# Patient Record
Sex: FEMALE | Race: WHITE
[De-identification: names, ages, dates, MRNs, and addresses within clinical notes are randomized per-mention and may not be internally consistent; named-entity substitution may affect disease eponyms.]

---

## 2017-05-28 ENCOUNTER — HOSPITAL ENCOUNTER (EMERGENCY)
Dept: HOSPITAL 25 - UCCORT | Age: 42
Discharge: HOME | End: 2017-05-28
Payer: COMMERCIAL

## 2017-05-28 VITALS — DIASTOLIC BLOOD PRESSURE: 85 MMHG | SYSTOLIC BLOOD PRESSURE: 130 MMHG

## 2017-05-28 DIAGNOSIS — M72.2: Primary | ICD-10-CM

## 2017-05-28 PROCEDURE — 99212 OFFICE O/P EST SF 10 MIN: CPT

## 2017-05-28 PROCEDURE — G0463 HOSPITAL OUTPT CLINIC VISIT: HCPCS

## 2017-05-28 NOTE — UC
Lower Extremity/Ankle HPI





- HPI Summary


HPI Summary: 


Right heel pain---worse after sleep and she has not been on her feet for a while

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 

shoes" with out much support








- History of Current Complaint


Chief Complaint: UCLowerExtremity


Stated Complaint: RIGHT FOOT/HEEL PAIN


Time Seen by Provider: 05/28/17 14:58


Hx Obtained From: Patient


Hx Last Menstrual Period: 5/25/17


Pregnant?: No


Onset/Duration: Sudden Onset, Lasting Weeks - 1, Still Present


Severity Initially: Moderate


Severity Currently: Moderate


Pain Intensity: 6


Pain Scale Used: 0-10 Numeric


Aggravating Factor(s): Standing, Ambulation


Alleviating Factor(s): Nothing


Able to Bear Weight: Yes





- Allergies/Home Medications


Allergies/Adverse Reactions: 


 Allergies











Allergy/AdvReac Type Severity Reaction Status Date / Time


 


No Known Allergies Allergy   Verified 05/28/17 14:41











Home Medications: 


 Home Medications





Levothyroxine TAB* [Synthroid TAB*] 25 mcg PO 0800 05/28/17 [History Confirmed 

05/28/17]











PMH/Surg Hx/FS Hx/Imm Hx


Previously Healthy: No


Endocrine History Of: Reports: Thyroid Disease, Hyperthyroidism


   Denies: Diabetes


Cardiovascular History Of: 


   Denies: Pacemaker/ICD





- Surgical History


Surgical History: None





- Family History


Known Family History: Positive: None


Family History: no cardio vascular issues in family lineage





- Social History


Occupation: Employed Full-time


Lives: With Family


Alcohol Use: Rare


Substance Use Type: None


Smoking Status (MU): Never Smoked Tobacco





Review of Systems


Constitutional: Negative


Skin: Negative


Eyes: Negative


ENT: Negative


Respiratory: Negative


Cardiovascular: Negative


Gastrointestinal: Negative


Genitourinary: Negative


Motor: Negative


Neurovascular: Negative


Musculoskeletal: Negative - foot/heel, Arthralgia


Neurological: Negative


Psychological: Negative


All Other Systems Reviewed And Are Negative: Yes





Physical Exam


Triage Information Reviewed: Yes


Appearance: Well-Appearing, No Pain Distress, Well-Nourished


Vital Signs: 


 Initial Vital Signs











Temp  98.1 F   05/28/17 14:37


 


Pulse  87   05/28/17 14:37


 


Resp  18   05/28/17 14:37


 


BP  130/85   05/28/17 14:37


 


Pulse Ox  95   05/28/17 14:37











Vital Signs Reviewed: Yes


Eye Exam: Normal


Eyes: Positive: Conjunctiva Clear


ENT Exam: Normal


ENT: Positive: Normal ENT inspection, Hearing grossly normal.  Negative: Nasal 

congestion, Nasal drainage, Trismus, Muffled/hoarse voice


Dental Exam: Normal


Neck exam: Normal


Neck: Positive: Supple, Nontender, No Lymphadenopathy


Respiratory Exam: Normal


Respiratory: Positive: Chest non-tender, No respiratory distress, No accessory 

muscle use


Cardiovascular Exam: Normal


Cardiovascular: Positive: RRR, No Murmur, Pulses Normal, Brisk Capillary Refill


Musculoskeletal Exam: Normal


Musculoskeletal: Positive: Strength Intact, ROM Intact, No Edema


Neurological Exam: Normal


Neurological: Positive: Alert, Muscle Tone Normal


Psychological Exam: Normal


Skin Exam: Normal





Lower Extremity Course/Dx





- Course


Course Of Treatment: plantar tendon exercises, shoe inserts cam boot ibuprofen 

follow with podiatry





- Differential Dx/Diagnosis


Differential Diagnosis/HQI/PQRI: Fracture (Closed), Sprain, Strain, Tendonitis


Provider Diagnoses: Right Plantar Tendonitis





Discharge





- Discharge Plan


Condition: Stable


Disposition: HOME


Patient Education Materials:  Ibuprofen (By mouth), Plantar Fasciitis (ED), 

Plantar Fasciitis Exercises (ED)


Referrals: 


Corky Renee DPM [Doctor of Podiatric Medicine] - 2 Weeks


Hoang Stafford DPM [Doctor of Podiatric Medicine] - 2 Weeks

## 2017-06-23 ENCOUNTER — HOSPITAL ENCOUNTER (EMERGENCY)
Dept: HOSPITAL 25 - UCCORT | Age: 42
Discharge: HOME | End: 2017-06-23
Payer: COMMERCIAL

## 2017-06-23 VITALS — DIASTOLIC BLOOD PRESSURE: 87 MMHG | SYSTOLIC BLOOD PRESSURE: 128 MMHG

## 2017-06-23 DIAGNOSIS — Z87.891: ICD-10-CM

## 2017-06-23 DIAGNOSIS — E07.9: ICD-10-CM

## 2017-06-23 DIAGNOSIS — B34.9: Primary | ICD-10-CM

## 2017-06-23 PROCEDURE — G0463 HOSPITAL OUTPT CLINIC VISIT: HCPCS

## 2017-06-23 PROCEDURE — 99211 OFF/OP EST MAY X REQ PHY/QHP: CPT

## 2017-06-23 NOTE — UC
Throat Pain/Nasal Jorge HPI





- HPI Summary


HPI Summary: 





Feeling tired and mild ST starting 1-2 days ago. Was dx with strep 6/6/17 and 

took 10 days of amox, was feeling completely resolved until sx started again. 

Denies fever, rash, cough, or trouble breathing.





- History of Current Complaint


Stated Complaint: SORE THROAT


Time Seen by Provider: 06/23/17 19:28


Hx Obtained From: Patient


Hx Last Menstrual Period: 6/21/17


Pregnant?: No


Onset/Duration: Gradual Onset, Lasting Days


Severity: Mild


Cough: None


Associated Signs & Symptoms: Negative: Nasal Discharge, Fever, Vomiting





- Allergies/Home Medications


Allergies/Adverse Reactions: 


 Allergies











Allergy/AdvReac Type Severity Reaction Status Date / Time


 


No Known Allergies Allergy   Verified 06/23/17 19:40











Home Medications: 


 Home Medications





Acetaminophen [Acetaminophen Extra Stren] 500 mg PO ONCE PRN 06/23/17 [History 

Confirmed 06/23/17]











PMH/Surg Hx/FS Hx/Imm Hx


Endocrine History: Thyroid Disease





- Surgical History


Surgical History: None





- Family History


Known Family History: 


   Negative: Blood Disorder





- Social History


Lives: With Family


Alcohol Use: Rare


Substance Use Type: None


Smoking Status (MU): Former Smoker


When Did the Patient Quit Smoking/Using Tobacco: 5.5 YRS AGO





Review of Systems


Constitutional: Fatigue


Skin: Negative


Eyes: Negative


ENT: Sore Throat


Respiratory: Negative


Cardiovascular: Negative


Gastrointestinal: Negative


Genitourinary: Negative


Motor: Negative


Neurovascular: Negative


Musculoskeletal: Negative


Neurological: Negative


Psychological: Negative


All Other Systems Reviewed And Are Negative: Yes





Physical Exam


Triage Information Reviewed: Yes


Appearance: Well-Appearing, No Pain Distress, Well-Nourished


Vital Signs: 


 Initial Vital Signs











Temp  98 F   06/23/17 19:36


 


Pulse  80   06/23/17 19:36


 


Resp  20   06/23/17 19:36


 


BP  128/87   06/23/17 19:36


 


Pulse Ox  100   06/23/17 19:36











Vital Signs Reviewed: Yes


Eye Exam: Normal


Eyes: Positive: Conjunctiva Clear


ENT Exam: Normal


ENT: Positive: Normal ENT inspection, Hearing grossly normal, Pharynx normal, 

TMs normal.  Negative: Tonsillar swelling, Tonsillar exudate


Dental Exam: Normal


Neck exam: Normal


Neck: Positive: Supple, Nontender, No Lymphadenopathy


Respiratory Exam: Normal


Respiratory: Positive: Chest non-tender, Lungs clear, Normal breath sounds, No 

respiratory distress, No accessory muscle use


Cardiovascular Exam: Normal


Cardiovascular: Positive: RRR, No Murmur


Musculoskeletal Exam: Normal


Neurological Exam: Normal


Neurological: Positive: Alert


Psychological Exam: Normal


Skin Exam: Normal





Throat Pain/Nasal Course/Dx





- Course


Course Of Treatment: daughter's RST is negative, pt's exam is not suggestive of 

strep.





- Differential Dx/Diagnosis


Provider Diagnoses: viral syndrome





Discharge





- Discharge Plan


Condition: Stable


Disposition: HOME


Patient Education Materials:  Viral Syndrome (ED)


Referrals: 


RUTHIE Hill [Primary Care Provider] -

## 2018-01-10 ENCOUNTER — HOSPITAL ENCOUNTER (EMERGENCY)
Dept: HOSPITAL 25 - UCCORT | Age: 43
Discharge: HOME | End: 2018-01-10
Payer: COMMERCIAL

## 2018-01-10 VITALS — DIASTOLIC BLOOD PRESSURE: 76 MMHG | SYSTOLIC BLOOD PRESSURE: 140 MMHG

## 2018-01-10 DIAGNOSIS — J11.1: Primary | ICD-10-CM

## 2018-01-10 DIAGNOSIS — Z87.891: ICD-10-CM

## 2018-01-10 DIAGNOSIS — E07.9: ICD-10-CM

## 2018-01-10 DIAGNOSIS — J32.9: ICD-10-CM

## 2018-01-10 PROCEDURE — 99212 OFFICE O/P EST SF 10 MIN: CPT

## 2018-01-10 PROCEDURE — G0463 HOSPITAL OUTPT CLINIC VISIT: HCPCS

## 2018-01-10 NOTE — UC
Respiratory Complaint HPI





- HPI Summary


HPI Summary: 





Cough and congestion for a few days. Fever and myalgias as well. She has 

headache and sinus pressure. She has no respiratory disease. 





- History of Current Complaint


Chief Complaint: UCGeneralIllness


Stated Complaint: FLU LIKE SXS


Time Seen by Provider: 01/10/18 13:44


Hx Obtained From: Patient


Hx Last Menstrual Period: ~12/25/17


Onset/Duration: Gradual Onset, Lasting Days


Timing: Constant


Severity Initially: Moderate


Severity Currently: Moderate


Character: Cough: Nonproductive


Aggravating Factors: Deep Breaths, Recumbent Position


Alleviating Factors: Nothing


Associated Signs And Symptoms: Positive: Fever, Chills, URI, Nasal Congestion.  

Negative: Wheezing, Hemoptysis, Calf Pain, Calf Swelling





- Allergies/Home Medications


Allergies/Adverse Reactions: 


 Allergies











Allergy/AdvReac Type Severity Reaction Status Date / Time


 


No Known Allergies Allergy   Verified 01/10/18 14:05











Home Medications: 


 Home Medications





Escitalopram (NF) [Lexapro 10 mg (NF)] 10 mg PO DAILY 01/10/18 [History 

Confirmed 01/10/18]











PMH/Surg Hx/FS Hx/Imm Hx


Previously Healthy: No - thyroid disease. 





- Surgical History


Surgical History: None





- Family History


Known Family History: Positive: Other - asthma.


   Negative: Blood Disorder





- Social History


Occupation: Employed Full-time


Alcohol Use: Rare


Substance Use Type: None


Smoking Status (MU): Former Smoker


Length of Time of Smoking/Using Tobacco: 1/2 PPD for 15 Years


When Did the Patient Quit Smoking/Using Tobacco: 2012





- Immunization History


Most Recent Influenza Vaccination: Not the 2017/2018 Season





Review of Systems


Constitutional: Fever, Chills


ENT: Sinus Congestion


Respiratory: Cough


Musculoskeletal: Myalgia


All Other Systems Reviewed And Are Negative: Yes





Physical Exam


Triage Information Reviewed: Yes


Appearance: Well-Appearing, No Pain Distress, Well-Nourished


Vital Signs: 


 Initial Vital Signs











Temp  99.7 F   01/10/18 14:03


 


Pulse  102   01/10/18 14:03


 


Resp  20   01/10/18 14:03


 


BP  140/76   01/10/18 14:03


 


Pulse Ox  99   01/10/18 14:03











Vital Signs Reviewed: Yes


Eyes: Positive: Conjunctiva Clear


ENT: Positive: Normal ENT inspection, Pharynx normal, Nasal congestion, TMs 

normal, Uvula midline.  Negative: TM bulging, TM dull, TM red, Tonsillar 

swelling, Tonsillar exudate, Trismus, Muffled voice, Hoarse voice, Sinus 

tenderness


Neck: Positive: Supple, Nontender, No Lymphadenopathy


Respiratory: Positive: Lungs clear, Normal breath sounds, No respiratory 

distress, No accessory muscle use.  Negative: Respiratory distress, Decreased 

breath sounds, Accessory muscle use, Crackles, Rhonchi, Stridor, Wheezing, 

Expiration


Cardiovascular: Positive: No Murmur, Pulses Normal, Brisk Capillary Refill


Abdomen Description: Positive: No Organomegaly, Soft.  Negative: Distended, 

Guarding


Musculoskeletal: Positive: ROM Intact, No Edema


Neurological Exam: Normal


Neurological: Positive: Alert, Muscle Tone Normal.  Negative: Fatigued


Psychological: Positive: Normal Response To Family, Age Appropriate Behavior


Skin: Negative: rashes





UC Diagnostic Evaluation





- Laboratory


O2 Sat by Pulse Oximetry: 99





Respiratory Course/Dx





- Course


Course Of Treatment:  has influenza. She has clear symptoms of influenza 

as well. She has early signs of sinusitis. She will attempt decongestants if 

not better and still having sinus pain, she will start antibiotic.





- Differential Dx/Diagnosis


Provider Diagnoses: uri.  sinusitis.  influenza.





Discharge





- Discharge Plan


Condition: Good


Disposition: HOME


Prescriptions: 


Amoxicillin PO (*) [Amoxicillin 500 MG CAP*] 500 mg PO TID #30 cap


Oseltamivir CAP* [Tamiflu CAP*] 75 mg PO BID #10 cap


Pseudoephedrine-Guaifenesin [Mucinex D  mg] 1 tab PO BID #30 tab


Patient Education Materials:  Influenza (ED)


Forms:  *Work Release


Referrals: 


Buddy STRICKLAND,Reji RICKS [Primary Care Provider] - If Needed

## 2018-01-10 NOTE — XMS REPORT
Paige Thomas

 Created on:2017



Patient:Paige Thomas

Sex:Female

:1975

External Reference #:[id number]





Demographics







 Address  [address] [place] [address]

 

 Home Phone  [phone]

 

 Work Phone  [phone]

 

 Email Address  [email or web address]

 

 Preferred Language  English

 

 Marital Status  Not  Or 

 

 Bahai Affiliation  Unknown

 

 Race  White

 

 Ethnic Group  Not  Or 









Author







 Organization  [facility]

 

 Address  [address] [place] [address]

 

 Phone  [phone]









Care Team Providers







 Name  Role  Phone

 

 Barbara Alonso NP  Care Team Information   Unavailable

 

 Barbara Alonso NP  Primary Care Physician  Unavailable









Payers







 Type  Date  Identification Numbers  Payment Provider  Subscriber

 

 Health Maintenance    Policy Number:  [facility]  Paige Thomas



 [facility]    [id number]    









 PayID: [id number]  [address] [place] [id number]







Problems







 Date  Description  Provider  Status

 

 Onset: 2012  Allergic rhinitis    Active

 

 Onset: 2012  Acquired hypothyroidism    Active

 

 Onset: 06/10/2011  Dermatitis    Active

 

 Onset: 2011  Moderate recurrent major depression    Active

 

 Onset: 2010  Bursitis    Active

 

 Onset: 2010  Joint pain    Active

 

 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







Family History







 Date  Family Member(s)  Problem(s)  Comments

 

   Father   due to Lung Cancer  ()

 

   Mother  Depression  







Social History







 Type  Date  Description  Comments

 

 Occupation      

 

 Cigarette Use    Quit 5 Years Ago  

 

 ETOH Use    Rare Use Of Alcohol  

 

 Recreational Drug Use    Never Used Drugs  







Allergies, Adverse Reactions, Alerts







 Date  Description  Reaction  Status  Severity  Comments

 

 2016  NKDA    active    







Medications







 Medication  Date  Status  Form  Strength  Qnty  SIG  Indications  Ordering



                 Provider

 

 Levothyroxine  /  Active  Tablets  25mcg  30tabs  1 tab by    Marissa Victor            mouth every    Tim,



             day    M.D.

 

                 

 

 Omeprazole  /  Hx  Capsules  40mg  60caps  1 by mouth    Lamine Victor -          every day    Tim,



   /              M.DSelin



   2016              

 

 Ibuprofen  /  Hx  Tablets  400mg    Ibuprofen    Unknown



    -          400 MG Tabs    



   09/28/              



   2016              







Vital Signs







 Date  Vital  Result  Comment

 

 2017  Weight  189.00 lb  









 Height  60.5 inches  5'0.50"

 

 BMI (Body Mass Index)  36.3 kg/m2  

 

 BP Systolic  141 mmHg  

 

 BP Diastolic  83 mmHg  

 

 Heart Rate  91 /min  

 

 O2 % BldC Oximetry  99 %  

 

 Body Temperature  99.0 F  

 

 Pain Level  0  









 2017  Weight  176.00 lb  









 Height  60.5 inches  5'0.50"

 

 BMI (Body Mass Index)  33.8 kg/m2  

 

 BP Systolic  133 mmHg  

 

 BP Diastolic  86 mmHg  

 

 Heart Rate  75 /min  

 

 O2 % BldC Oximetry  97 %  

 

 Body Temperature  97.9 F  









 11/10/2016  Weight  176.00 lb  









 Height  60.5 inches  5'0.50"

 

 BMI (Body Mass Index)  33.8 kg/m2  

 

 BP Systolic  124 mmHg  

 

 BP Diastolic  72 mmHg  

 

 Heart Rate  73 /min  

 

 O2 % BldC Oximetry  98 %  

 

 Body Temperature  97.9 F  

 

 Jefferson Score  7  









 2016  Weight  174.00 lb  









 Height  60.5 inches  5'0.50"

 

 BMI (Body Mass Index)  33.4 kg/m2  

 

 BP Systolic  126 mmHg  

 

 BP Diastolic  84 mmHg  

 

 Heart Rate  71 /min  

 

 O2 % BldC Oximetry  98 %  

 

 Body Temperature  98.2 F  







Results







 Test  Date  Test  Result  H/L  Range  Note

 

 TSH+Free T4  2017  Thyroid Stim Hormone  2.88 uIU/mL    0.30-4.20  1









 Free T4  1.11 ng/dL    0.76-1.46  1









 Basic Metabolic Panel  2017  Glucose  84 mg/dL      1









 BUN  10 mg/dL    7-18  1

 

 Creatinine  0.6 mg/dL    0.6-1.3  1

 

 Glom Filtration Rate, Estimate  &gt;60 mL/min    &gt;60  1

 

 If   &gt;60 mL/min    &gt;60  1, 2

 

 BUN/Creat  16.6 ratio      1

 

 Sodium  141 mmol/L    136-145  1

 

 Potassium  4.1 mmol/L    3.5-5.1  1

 

 Chloride  107 mmol/L      1

 

 Carbon Dioxide  28 mmol/L    21-32  1

 

 Anion Gap  6 mEq/L  Low  8-16  1

 

 Calcium  9.4 mg/dL    8.5-10.1  1









 CBS W/Automated Diff  2017  White Blood Count  7.8 K/uL    3.1-10.7  1









 Red Blood Count  4.43 M/uL    3.90-5.40  1

 

 Hemoglobin  12.4 gm/dL    11.6-15.8  1

 

 Hematocrit  37.5 %    36.0-46.1  1

 

 Mean Cell Volume  84.7 fl    80.9-99.0  1

 

 Mean Corpuscular HGB  28.0 pg    25.9-32.7  1

 

 Mean Corpuscular HGB Conc  33.1 g/dL    30.8-34.3  1

 

 Platelet Count  307 K/uL    150-400  1

 

 Red Cell Distri Width SD  42.2 fl    3-47  1

 

 Red Cell Distri Width %CV  13.9 %    11.7-14.4  1

 

 Mean Platelet Volume  10.8 fL    8.9-12.4  1

 

 Neut%  55.3 %    40.4-72.8  1

 

 Lymph %  31.3 %    20.0-42.0  1

 

 Mono %  7.7 %    4.3-13.2  1

 

 Eo%  4.9 %    0.0-6.6  1

 

 Bas%  0.8 %    0.0-1.1  1

 

 Neut#  4.32 K/uL    1.8-7.0  1

 

 Lymph #  2.44 K/uL    1.0-4.0  1

 

 Mono #  0.60 K/uL    0.3-0.9  1

 

 Eos #  0.38 K/uL    0.0-0.5  1

 

 Baso #  0.06 K/uL    0.0-0.1  1









 TSH+Free T4  11/10/2016  TSH (Thyroid Stim Horm)  3.78 mcIU/mL    0.34-5.60  3









 Free T4 (Free Thyroxine)  0.96 ng/dL    0.61-1.12  4









 Thyroid Stim Hormone  2016  Thyroid Stim Hormone  2.97 uIU/mL    0.30-
4.20  5









 @[facility] Pat Id:  [id number]      5

 

 @[facility] Req #:  [id number]      5









 Free T4  2016  Free T4  1.22 ng/dL    0.76-1.46  5









 @[facility] Pat Id:  [id number]      5

 

 @[facility] Req #:  [id number]      5









 1  E03.9 R53.83

 

 2  Note:



   Persistent reduction for 3 months or more in an eGFR &lt;60



   mL/min/1.73 m2 defines CKD.  Patients with eGFR values &gt;/=60



   mL/min/1.73 m2 may also have CKD if evidence of persistent



   proteinuria is present.



   



   The original MDRD equation for estimated GFR is not valid



   for patients less than 18 years of age.



   



   Additional information may be found at www.kdoqi.org.

 

 3  [id number]

 

 4  [id number]

 

 5  R22.1







Procedures







 Date  CPT Code  Description  Status

 

 2017  17613  Ultrasound Head/Neck  Completed

 

 2016  41623  Ultrasound Head/Neck  Completed

 

 2016  25664  Fiberoptic Laryngoscopy,Diag.  Completed







Encounters







 Type  Date  Location  Provider  CPT E/M  Dx

 

 Office Visit  2017 10:45a  Main Office  Tim Victor M.D.  [id number]  E03.9









 E66.9









 Office Visit  11/10/2016  8:15a  Main Office  Tim Victor M.D.  [id number]  E03.9









 R06.83

 

 G47.9









 Office Visit  2016  9:45a  Main Office  Tim Victor M.D.  [id number]  K21.9









 E66.9

 

 E03.9

 

 E06.9

 

 R09.82







Plan of Care

No Information Available

## 2018-02-18 ENCOUNTER — HOSPITAL ENCOUNTER (EMERGENCY)
Dept: HOSPITAL 25 - UCCORT | Age: 43
Discharge: HOME | End: 2018-02-18
Payer: COMMERCIAL

## 2018-02-18 VITALS — DIASTOLIC BLOOD PRESSURE: 73 MMHG | SYSTOLIC BLOOD PRESSURE: 134 MMHG

## 2018-02-18 DIAGNOSIS — J02.0: Primary | ICD-10-CM

## 2018-02-18 PROCEDURE — 87502 INFLUENZA DNA AMP PROBE: CPT

## 2018-02-18 PROCEDURE — 87651 STREP A DNA AMP PROBE: CPT

## 2018-02-18 PROCEDURE — G0463 HOSPITAL OUTPT CLINIC VISIT: HCPCS

## 2018-02-18 PROCEDURE — 99212 OFFICE O/P EST SF 10 MIN: CPT

## 2018-02-18 NOTE — UC
Throat Pain/Nasal Jorge HPI





- HPI Summary


HPI Summary: 





Sore throat fever and body aches began 1-2 days ago---exposed to flu.strep and 

mono last week had influenza b last month





- History of Current Complaint


Chief Complaint: UCRespiratory


Stated Complaint: SORE THROAT, FEVER


Time Seen by Provider: 02/18/18 17:22


Hx Obtained From: Patient


Hx Last Menstrual Period: ~12/25/17


Pregnant?: No


Onset/Duration: Sudden Onset


Severity: Moderate


Pain Intensity: 5


Pain Scale Used: 0-10 Numeric


Cough: None


Associated Signs & Symptoms: Positive: Fever





- Allergies/Home Medications


Allergies/Adverse Reactions: 


 Allergies











Allergy/AdvReac Type Severity Reaction Status Date / Time


 


No Known Allergies Allergy   Verified 02/18/18 17:09














PMH/Surg Hx/FS Hx/Imm Hx


Previously Healthy: No


Endocrine History: Hypothyroidism


Psychological History: Depression





- Surgical History


Surgical History: None





- Family History


Known Family History: Positive: Other - asthma.


   Negative: Blood Disorder





- Social History


Occupation: Employed Full-time


Lives: With Family


Alcohol Use: Occasionally


Substance Use Type: None


Smoking Status (MU): Former Smoker


Length of Time of Smoking/Using Tobacco: 1/2 PPD for 15 Years


When Did the Patient Quit Smoking/Using Tobacco: 2012





- Immunization History


Most Recent Influenza Vaccination: 2017/2018





Review of Systems


Constitutional: Fever, Chills, Fatigue


Skin: Negative


Eyes: Negative


ENT: Sore Throat


Respiratory: Negative


Cardiovascular: Negative


Gastrointestinal: Negative


Genitourinary: Negative


Motor: Negative


Neurovascular: Negative


Musculoskeletal: Myalgia


Neurological: Headache


Psychological: Negative


Is Patient Immunocompromised?: No


All Other Systems Reviewed And Are Negative: Yes





Physical Exam


Triage Information Reviewed: Yes


Appearance: Well-Nourished, Ill-Appearing - mild, Pain Distress - mild


Vital Signs: 


 Initial Vital Signs











Temp  99 F   02/18/18 17:10


 


Pulse  105   02/18/18 17:10


 


Resp  16   02/18/18 17:10


 


BP  134/73   02/18/18 17:10


 


Pulse Ox  99   02/18/18 17:10











Vital Signs Reviewed: Yes


Eye Exam: Normal


Eyes: Positive: Conjunctiva Clear


ENT Exam: Normal


ENT: Positive: Normal ENT inspection, Hearing grossly normal, Pharynx normal, 

Uvula midline.  Negative: Nasal congestion, Tonsillar swelling, Tonsillar 

exudate, Trismus, Hoarse voice, Dental tenderness, Sinus tenderness


Dental Exam: Normal


Neck exam: Normal


Neck: Positive: Supple, Nontender, No Lymphadenopathy


Respiratory Exam: Normal


Respiratory: Positive: Chest non-tender, Lungs clear, Normal breath sounds, No 

respiratory distress, No accessory muscle use


Cardiovascular Exam: Normal


Cardiovascular: Positive: RRR, No Murmur, Pulses Normal, Brisk Capillary Refill


Musculoskeletal Exam: Normal


Musculoskeletal: Positive: Strength Intact, ROM Intact, No Edema


Neurological Exam: Normal


Neurological: Positive: Alert, Muscle Tone Normal


Psychological Exam: Normal


Skin Exam: Normal





Diagnostics





- Laboratory


Diagnostic Studies Completed/Ordered: RST (+)





Throat Pain/Nasal Course/Dx





- Course


Assessment/Plan: amoxicillin, increase fluids, tylenol, ibuprofen for pain 

follow with pcp prn





- Differential Dx/Diagnosis


Provider Diagnoses: strep pharyngitis





Discharge





- Discharge Plan


Condition: Stable


Disposition: HOME


Prescriptions: 


Amoxicillin PO (*) [Amoxicillin 500 MG CAP*] 500 mg PO Q12H #19 cap


Patient Education Materials:  Strep Throat (ED)


Forms:  *Work Release


Referrals: 


Buddy STRICKLAND,Reji RICKS [Primary Care Provider] - If Needed

## 2018-02-18 NOTE — XMS REPORT
Paige Thomas

 Created on:2018



Patient:Paige Thomas

Sex:Female

:1975

External Reference #:[id number]





Demographics







 Address  [address] [place] [address]

 

 Home Phone  [phone]

 

 Work Phone  [phone]

 

 Email Address  [email or web address]

 

 Preferred Language  English

 

 Marital Status  Not  Or 

 

 Jew Affiliation  Unknown

 

 Race  White

 

 Ethnic Group  Not  Or 









Author







 Organization  [facility]

 

 Address  [address] [place] [address]

 

 Phone  [phone]









Care Team Providers







 Name  Role  Phone

 

 Reji Goodwin PA  Care Team Information   Unavailable

 

 Reji Goodwin PA  Primary Care Physician  Unavailable









Payers







 Type  Date  Identification Numbers  Payment Provider  Subscriber

 

 Health Maintenance    Policy Number:  [facility]  Paige Thomas



 [facility]    [id number]    









 PayID: [id number]  [address] [place] [address]







Problems







 Date  Description  Provider  Status

 

 Onset: 2012  Allergic rhinitis    Active

 

 Onset: 2012  Acquired hypothyroidism    Active

 

 Onset: 06/10/2011  Dermatitis    Active

 

 Onset: 2011  Moderate recurrent major depression    Active

 

 Onset: 2010  Bursitis    Active

 

 Onset: 2010  Joint pain    Active

 

 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







Family History







 Date  Family Member(s)  Problem(s)  Comments

 

   Father   due to Lung Cancer  ()

 

   Mother  Depression  







Social History







 Type  Date  Description  Comments

 

 Occupation      

 

 Cigarette Use    Quit 5 Years Ago  

 

 ETOH Use    Rare Use Of Alcohol  

 

 Recreational Drug Use    Never Used Drugs  







Allergies, Adverse Reactions, Alerts







 Date  Description  Reaction  Status  Severity  Comments

 

 2016  NKDA    active    







Medications







 Medication  Date  Status  Form  Strength  Qnty  SIG  Indications  Ordering



                 Provider

 

 Levothyroxine  /  Active  Tablets  25mcg  30tabs  1 tab by    Marissa Victor            mouth every    Tim,



             day    M.D.

 

                 

 

 Omeprazole  /  Hx  Capsules  40mg  60caps  1 by mouth    Lamine Victor -    DR      every day    Tim,



   /              M.D.



                 

 

 Ibuprofen  /  Hx  Tablets  400mg    Ibuprofen    Unknown



   2010 -          400 MG Tabs    



   2016              







Vital Signs







 Date  Vital  Result  Comment

 

 2018  Weight  189.00 lb  









 Height  60.5 inches  5'0.50"

 

 BMI (Body Mass Index)  36.3 kg/m2  

 

 BP Systolic  132 mmHg  

 

 BP Diastolic  84 mmHg  

 

 Heart Rate  82 /min  

 

 O2 % BldC Oximetry  99 %  

 

 Body Temperature  97.3 F  

 

 Pain Level  0  









 2017  Weight  189.00 lb  









 Height  60.5 inches  5'0.50"

 

 BMI (Body Mass Index)  36.3 kg/m2  

 

 BP Systolic  141 mmHg  

 

 BP Diastolic  83 mmHg  

 

 Heart Rate  91 /min  

 

 O2 % BldC Oximetry  99 %  

 

 Body Temperature  99.0 F  

 

 Pain Level  0  









 2017  Weight  176.00 lb  









 Height  60.5 inches  5'0.50"

 

 BMI (Body Mass Index)  33.8 kg/m2  

 

 BP Systolic  133 mmHg  

 

 BP Diastolic  86 mmHg  

 

 Heart Rate  75 /min  

 

 O2 % BldC Oximetry  97 %  

 

 Body Temperature  97.9 F  









 11/10/2016  Weight  176.00 lb  









 Height  60.5 inches  5'0.50"

 

 BMI (Body Mass Index)  33.8 kg/m2  

 

 BP Systolic  124 mmHg  

 

 BP Diastolic  72 mmHg  

 

 Heart Rate  73 /min  

 

 O2 % BldC Oximetry  98 %  

 

 Body Temperature  97.9 F  

 

 [place] Score  7  









 2016  Weight  174.00 lb  









 Height  60.5 inches  5'0.50"

 

 BMI (Body Mass Index)  33.4 kg/m2  

 

 BP Systolic  126 mmHg  

 

 BP Diastolic  84 mmHg  

 

 Heart Rate  71 /min  

 

 O2 % BldC Oximetry  98 %  

 

 Body Temperature  98.2 F  







Results







 Test  Date  Test  Result  H/L  Range  Note

 

 TSH+Free T4  12/15/2017  TSH (Thyroid Stim Horm)  3.72 mcIU/mL    0.34-5.60  









 Free T4 (Free Thyroxine)  0.83 ng/dL    0.61-1.12  









 TSH+Free T4  2017  Thyroid Stim Hormone  2.88 uIU/mL    0.30-4.20  1









 Free T4  1.11 ng/dL    0.76-1.46  1









 Basic Metabolic Panel  2017  Glucose  84 mg/dL      1









 BUN  10 mg/dL    7-18  1

 

 Creatinine  0.6 mg/dL    0.6-1.3  1

 

 Glom Filtration Rate, Estimate  &gt;60 mL/min    &gt;60  1

 

 If   &gt;60 mL/min    &gt;60  1, 2

 

 BUN/Creat  16.6 ratio      1

 

 Sodium  141 mmol/L    136-145  1

 

 Potassium  4.1 mmol/L    3.5-5.1  1

 

 Chloride  107 mmol/L      1

 

 Carbon Dioxide  28 mmol/L    21-32  1

 

 Anion Gap  6 mEq/L  Low  8-16  1

 

 Calcium  9.4 mg/dL    8.5-10.1  1









 CBS W/Automated Diff  2017  White Blood Count  7.8 K/uL    3.1-10.7  1









 Red Blood Count  4.43 M/uL    3.90-5.40  1

 

 Hemoglobin  12.4 gm/dL    11.6-15.8  1

 

 Hematocrit  37.5 %    36.0-46.1  1

 

 Mean Cell Volume  84.7 fl    80.9-99.0  1

 

 Mean Corpuscular HGB  28.0 pg    25.9-32.7  1

 

 Mean Corpuscular HGB Conc  33.1 g/dL    30.8-34.3  1

 

 Platelet Count  307 K/uL    150-400  1

 

 Red Cell Distri Width SD  42.2 fl    3-47  1

 

 Red Cell Distri Width %CV  13.9 %    11.7-14.4  1

 

 Mean Platelet Volume  10.8 fL    8.9-12.4  1

 

 Neut%  55.3 %    40.4-72.8  1

 

 Lymph %  31.3 %    20.0-42.0  1

 

 Mono %  7.7 %    4.3-13.2  1

 

 Eo%  4.9 %    0.0-6.6  1

 

 Bas%  0.8 %    0.0-1.1  1

 

 Neut#  4.32 K/uL    1.8-7.0  1

 

 Lymph #  2.44 K/uL    1.0-4.0  1

 

 Mono #  0.60 K/uL    0.3-0.9  1

 

 Eos #  0.38 K/uL    0.0-0.5  1

 

 Baso #  0.06 K/uL    0.0-0.1  1









 TSH+Free T4  11/10/2016  TSH (Thyroid Stim Horm)  3.78 mcIU/mL    0.34-5.60  3









 Free T4 (Free Thyroxine)  0.96 ng/dL    0.61-1.12  4









 Thyroid Stim Hormone  2016  Thyroid Stim Hormone  2.97 uIU/mL    0.30-
4.20  5









 @[facility] Pat Id:  [id number]      5

 

 @[facility] Req #:  [id number]      5









 Free T4  2016  Free T4  1.22 ng/dL    0.76-1.46  5









 @[facility] Id:  [id number]      5

 

 @[facility] #:  [id number]      5









 1  E03.9 R53.83

 

 2  Note:



   Persistent reduction for 3 months or more in an eGFR &lt;60



   mL/min/1.73 m2 defines CKD.  Patients with eGFR values &gt;/=60



   mL/min/1.73 m2 may also have CKD if evidence of persistent



   proteinuria is present.



   



   The original MDRD equation for estimated GFR is not valid



   for patients less than 18 years of age.



   



   Additional information may be found at www.kdoqi.org.

 

 3  [id number]

 

 4  NSO568834

 

 5  R22.1







Procedures







 Date  CPT Code  Description  Status

 

 2017  05482  Ultrasound Head/Neck  Completed

 

 2016  77097  Ultrasound Head/Neck  Completed

 

 2016  86873  Fiberoptic Laryngoscopy,Diag.  Completed







Encounters







 Type  Date  Location  Provider  CPT E/M  Dx

 

 Office Visit  2017  2:45p  Main Office  Tim Victor M.D.  [id number]  E03.9









 E66.9









 Office Visit  2017 10:45a  Main Office  Tim Victor M.D.  [id number]  E03.9









 E66.9









 Office Visit  11/10/2016  8:15a  Main Office  Tim Victor M.D.  99856  E03.9









 R06.83

 

 G47.9









 Office Visit  2016  9:45a  Main Office  Tim Victor M.D.  [id number]  K21.9









 E66.9

 

 E03.9

 

 E06.9

 

 R09.82







Plan of Care

Future Appointment(s):2018  1:00 pm - Tim Victor M.D. at [facility]

## 2019-04-19 ENCOUNTER — HOSPITAL ENCOUNTER (EMERGENCY)
Dept: HOSPITAL 25 - ED | Age: 44
LOS: 1 days | Discharge: HOME | End: 2019-04-20
Payer: COMMERCIAL

## 2019-04-19 ENCOUNTER — HOSPITAL ENCOUNTER (EMERGENCY)
Dept: HOSPITAL 25 - UCEAST | Age: 44
Discharge: HOME HEALTH | End: 2019-04-19
Payer: COMMERCIAL

## 2019-04-19 VITALS — SYSTOLIC BLOOD PRESSURE: 147 MMHG | DIASTOLIC BLOOD PRESSURE: 100 MMHG

## 2019-04-19 DIAGNOSIS — R11.0: ICD-10-CM

## 2019-04-19 DIAGNOSIS — Z87.19: ICD-10-CM

## 2019-04-19 DIAGNOSIS — R50.9: ICD-10-CM

## 2019-04-19 DIAGNOSIS — R10.9: Primary | ICD-10-CM

## 2019-04-19 DIAGNOSIS — R82.90: ICD-10-CM

## 2019-04-19 DIAGNOSIS — Z87.891: ICD-10-CM

## 2019-04-19 DIAGNOSIS — M54.5: Primary | ICD-10-CM

## 2019-04-19 DIAGNOSIS — E03.9: ICD-10-CM

## 2019-04-19 DIAGNOSIS — M54.5: ICD-10-CM

## 2019-04-19 LAB
ALBUMIN SERPL BCG-MCNC: 4 G/DL (ref 3.2–5.2)
ALBUMIN/GLOB SERPL: 1.4 {RATIO} (ref 1–3)
ALP SERPL-CCNC: 76 U/L (ref 34–104)
ALT SERPL W P-5'-P-CCNC: 15 U/L (ref 7–52)
ANION GAP SERPL CALC-SCNC: 8 MMOL/L (ref 2–11)
AST SERPL-CCNC: 11 U/L (ref 13–39)
BASOPHILS # BLD AUTO: 0.1 10^3/UL (ref 0–0.2)
BUN SERPL-MCNC: 11 MG/DL (ref 6–24)
BUN/CREAT SERPL: 17.5 (ref 8–20)
CALCIUM SERPL-MCNC: 9 MG/DL (ref 8.6–10.3)
CHLORIDE SERPL-SCNC: 106 MMOL/L (ref 101–111)
EOSINOPHIL # BLD AUTO: 0.5 10^3/UL (ref 0–0.6)
GLOBULIN SER CALC-MCNC: 2.9 G/DL (ref 2–4)
GLUCOSE SERPL-MCNC: 87 MG/DL (ref 70–100)
HCG SERPL QL: < 0.6 MIU/ML
HCO3 SERPL-SCNC: 24 MMOL/L (ref 22–32)
HCT VFR BLD AUTO: 32 % (ref 33–41)
HGB BLD-MCNC: 10.6 G/DL (ref 12–16)
LYMPHOCYTES # BLD AUTO: 2.5 10^3/UL (ref 1–4.8)
MCH RBC QN AUTO: 28 PG (ref 27–31)
MCHC RBC AUTO-ENTMCNC: 34 G/DL (ref 31–36)
MCV RBC AUTO: 83 FL (ref 80–97)
MONOCYTES # BLD AUTO: 0.9 10^3/UL (ref 0–0.8)
NEUTROPHILS # BLD AUTO: 4.5 10^3/UL (ref 1.5–7.7)
NRBC # BLD AUTO: 0 10^3/UL
NRBC BLD QL AUTO: 0
PLATELET # BLD AUTO: 344 10^3/UL (ref 150–450)
POTASSIUM SERPL-SCNC: 3.6 MMOL/L (ref 3.5–5)
PROT SERPL-MCNC: 6.9 G/DL (ref 6.4–8.9)
RBC # BLD AUTO: 3.84 10^6 /UL (ref 3.7–4.87)
SODIUM SERPL-SCNC: 138 MMOL/L (ref 135–145)
WBC # BLD AUTO: 8.4 10^3/UL (ref 3.5–10.8)

## 2019-04-19 PROCEDURE — G0463 HOSPITAL OUTPT CLINIC VISIT: HCPCS

## 2019-04-19 PROCEDURE — 81003 URINALYSIS AUTO W/O SCOPE: CPT

## 2019-04-19 PROCEDURE — 83690 ASSAY OF LIPASE: CPT

## 2019-04-19 PROCEDURE — 99212 OFFICE O/P EST SF 10 MIN: CPT

## 2019-04-19 PROCEDURE — 85025 COMPLETE CBC W/AUTO DIFF WBC: CPT

## 2019-04-19 PROCEDURE — 99282 EMERGENCY DEPT VISIT SF MDM: CPT

## 2019-04-19 PROCEDURE — 83605 ASSAY OF LACTIC ACID: CPT

## 2019-04-19 PROCEDURE — 84702 CHORIONIC GONADOTROPIN TEST: CPT

## 2019-04-19 PROCEDURE — 86140 C-REACTIVE PROTEIN: CPT

## 2019-04-19 PROCEDURE — 80053 COMPREHEN METABOLIC PANEL: CPT

## 2019-04-19 PROCEDURE — 81015 MICROSCOPIC EXAM OF URINE: CPT

## 2019-04-19 PROCEDURE — 36415 COLL VENOUS BLD VENIPUNCTURE: CPT

## 2019-04-19 PROCEDURE — 96374 THER/PROPH/DIAG INJ IV PUSH: CPT

## 2019-04-19 PROCEDURE — 87086 URINE CULTURE/COLONY COUNT: CPT

## 2019-04-19 NOTE — UC
Abdominal Pain Female HPI





- HPI Summary


HPI Summary: 


43-year-old female comes in with a chief complaint of bilateral flank and 

abdominal pain.  Started about 3 days ago.  Initially started in bilateral 

flanks.  She reports a fever.  No complaint of any dysuria.  Last period was 

one half weeks ago denies any abnormal vaginal discharge or concern of STI.  No 

prior abdominal surgeries.  The pain anteriorly is more the mid-level of the 

abdomen and bilateral.  Pain is about a 6 out of 10.  Movement does make the 

pain worse.  She has decreased appetite.  Eating does not make the pain worse.  

No change in bowels no diarrhea no blood in the stools. Has Hx pancreatitis. 

Patient reports this feels different from her prior episode of pancreatitis.





- History of Current Complaint


Stated Complaint: MID AREA PAIN


Time Seen by Provider: 04/19/19 17:26


Hx Last Menstrual Period: ~12/25/17


Allergies/Adverse Reactions: 


 Allergies











Allergy/AdvReac Type Severity Reaction Status Date / Time


 


No Known Allergies Allergy   Verified 02/18/18 17:09











Home Medications: 


 Home Medications





Ibuprofen 600 mg PO 04/19/19 [History]











PMH/Surg Hx/FS Hx/Imm Hx


Previously Healthy: Yes


Other GI/ History: PANCREATITIS





- Surgical History


Surgical History: None





- Family History


Known Family History: Positive: Other - asthma.


   Negative: Blood Disorder





- Social History


Alcohol Use: Occasionally


Substance Use Type: None


Smoking Status (MU): Former Smoker


Length of Time of Smoking/Using Tobacco: 1/2 PPD for 15 Years


When Did the Patient Quit Smoking/Using Tobacco: 2012





- Immunization History


Most Recent Influenza Vaccination: 2017/2018





Review of Systems


All Other Systems Reviewed And Are Negative: Yes


Constitutional: Positive: Fever


Skin: Positive: Negative


Eyes: Positive: Negative


ENT: Positive: Negative


Respiratory: Positive: Negative


Cardiovascular: Positive: Negative


Gastrointestinal: Positive: Abdominal Pain, Nausea


Genitourinary: Positive: Negative.  Negative: Dysuria, Hematuria, Frequency, 

Urgency


Motor: Positive: Negative


Neurovascular: Positive: Negative


Musculoskeletal: Positive: Negative


Neurological: Positive: Negative


Psychological: Positive: Negative


Is Patient Immunocompromised?: No





Physical Exam


Triage Information Reviewed: Yes


Appearance: Well-Appearing, Well-Nourished, Pain Distress - mild


Vital Signs Reviewed: Yes


Eye Exam: Normal


Eyes: Positive: Conjunctiva Clear


Neck: Positive: Supple


Respiratory: Positive: Lungs clear, Normal breath sounds, No respiratory 

distress


Cardiovascular: Positive: RRR


Abdomen Description: Positive: CVA Tenderness (R), CVA Tenderness (L), Other: - 

Mild mid abd tenderness to palpation. No rlq tenderness. Negative heel stike 

and obturator sign.


Bowel Sounds: Positive: Hypoactive


Musculoskeletal Exam: Normal


Musculoskeletal: Positive: Strength Intact, ROM Intact


Neurological Exam: Normal


Neurological: Positive: Alert, Muscle Tone Normal


Psychological Exam: Normal


Psychological: Positive: Age Appropriate Behavior


Skin Exam: Normal





Abd Pain Female Course/Dx





- Course


Course Of Treatment: 


The urine has a trace of leukocytes in it.  Patient reports a fever at home.  

With the patient is a history of pancreatitis I recommended the patient go to 

the emergency department for further evaluation and care.





- Differential Dx/Diagnosis


Provider Diagnosis: 


 Abdominal pain, Bilateral flank pain








Discharge





- Sign-Out/Discharge


Documenting (check all that apply): Patient Departure


All imaging exams completed and their final reports reviewed: No Studies





- Discharge Plan


Condition: Stable


Disposition: HOME-RECOMMEND TO ED


Patient Education Materials:  Acute Abdominal Pain (ED), Flank Pain (ED)


Referrals: 


Buddy STRICKLAND,Reji RICKS [Primary Care Provider] - 


Additional Instructions: 


GO DIRECTLY TO THE EMERGENCY DEPARTMENT FOR FURTHER EVALUATION OF YOU BILATERAL 

FLANK PAIN AND ABDOMINAL PAIN








- Billing Disposition and Condition


Condition: STABLE


Disposition: Home-Recommend to ED

## 2019-04-19 NOTE — ED
Back Pain





- HPI Summary


HPI Summary: 


The patient is a 42 y/o F presenting to [facility] with a chief complaint of sudden 

onset bilateral back pain that radiates into the bilateral flanks starting two 

days ago with gradual worsening. The pain is described as a dull but constant 

pain that is currently rated 6/10 in severity. She took 600mg Motrin to no 

relief. She reports that she has had pancreatitis without known cause, but this 

pain is more similar to a UTI that spread to a kidney infection and stones she 

had a few years ago. She denies dysuria, hematuria, and changes in urine color. 

She visited [facility] today who recommended she come here for further workup. No abd 

surgeries.





- History of Current Complaint


Chief Complaint: EDFlankPain


Stated Complaint: SIDE AND BACK PAIN AND FEVER PER PT


Time Seen by Provider: 04/19/19 20:23


Hx Obtained From: Patient


Hx Last Menstrual Period: ~12/25/17


Onset/Duration: Sudden Onset, Lasting Days - wto, Still Present


Onset/Duration: Started Days Ago, Still Present


Back Pain Location: Is Discrete @ - bilateral low back, Radiates To - bilateral 

flanks


Severity Initially: Moderate


Severity Currently: Moderate


Pain Intensity: 6


Pain Scale Used: 0-10 Numeric


Character: Dull


Aggravating Symptom(s): Movement


Alleviating Symptom(s): Nothing


Associated Signs And Symptoms: Positive: Flank Pain, Other - NEGATIVE: dysuria, 

hematuria, change in urine color





- Allergies/Home Medications


Allergies/Adverse Reactions: 


 Allergies











Allergy/AdvReac Type Severity Reaction Status Date / Time


 


No Known Allergies Allergy   Verified 02/18/18 17:09














PMH/Surg Hx/FS Hx/Imm Hx


Endocrine/Hematology History: Reports: Hx Thyroid Disease - Hypothyroidism


   Denies: Hx Diabetes, Hx Anemia


Cardiovascular History: 


   Denies: Hx Pacemaker/ICD


GI History: 


   Denies: Hx Jaundice


Sensory History: 


   Denies: Hx Hearing Aid


Psychiatric History: 


   Denies: Hx Panic Disorder





- Cancer History


Hx Chemotherapy: No


Hx Radiation Therapy: No





- Surgical History


Surgery Procedure, Year, and Place: none





- Immunization History


Date of Tetanus Vaccine: Up to date


Date of Influenza Vaccine: Fall 2012


Infectious Disease History: No


Infectious Disease History: 


   Denies: Traveled Outside the [place] in Last 30 Days





- Family History


Known Family History: Positive: Other - asthma.


   Negative: Blood Disorder





- Social History


Alcohol Use: Occasionally


Substance Use Type: Reports: None


Smoking Status (MU): Former Smoker


Length of Time of Smoking/Using Tobacco: 1/2 PPD for 15 Years





Review of Systems


Positive: flank pain - bilateral radiating from back pain, other - NEGATIVE: 

changes in urine color.  Negative: dysuria, hematuria


Positive: Other - bilateral low back pain


All Other Systems Reviewed And Are Negative: Yes





Physical Exam





- Summary


Physical Exam Summary: 


Appearance: Well-appearing, Well-nourished, lying in bed comfortably


Skin: Warm, dry, no obvious rash


Eyes: sclera anicteric, no conjunctival pallor


ENT: mucous membranes moist, pharynx appears normal


Neck: Supple, nontender


Respiratory: Clear to auscultation, no signs of respiratory distress


Cardiovascular: Normal S1, S2. No murmurs. Normal distal pulses in tibial and 

radial bilaterally.


Abdomen: Soft, nontender, normal active bowel sounds present


Musculoskeletal: Normal, Strength/ROM Intact


Neurological: A&Ox3, awake and alert, mentation is normal, speech is fluent and 

appropriate


Psychiatric: affect is normal, does not appear anxious or depressed


Triage Information Reviewed: Yes


Vital Signs On Initial Exam: 


 Initial Vitals











Temp Pulse Resp BP Pulse Ox


 


 99 F   83   16   161/105   99 


 


 04/19/19 19:05  04/19/19 19:05  04/19/19 19:05  04/19/19 19:05  04/19/19 19:05











Vital Signs Reviewed: Yes





Diagnostics





- Vital Signs


 Vital Signs











  Temp Pulse Resp BP Pulse Ox


 


 04/19/19 19:05  99 F  83  16  161/105  99














- Laboratory


Result Diagrams: 


 04/19/19 20:51





 04/19/19 20:51


Lab Statement: Any lab studies that have been ordered have been reviewed, and 

results considered in the medical decision making process.





Re-Evaluation





- Re-Evaluation


  ** First Eval


Re-Evaluation Time: 01:00


Change: Improved


Comment: I spoke with the patient concerning lab results. She is feeling better 

with the Morphine.





Back Pain Course/Dx





- Course


Course Of Treatment: This is a 43-year-old woman with bilateral lower back 

pain.  She was concerned about the possibility of kidney infection or stone.  

However she does not appear colicky, is not febrile, does not have a white count

, urinalysis only shows 1+ blood.  I suspect her pain is muscular skeletal back 

pain rather than any kind of kidney pathology based on this evaluation.  For 

now treatment just recently dramatic. Upon physical exam, there are no acute 

abnormalities at this time. In the ED course, she was administered Morphine for 

pain. She is diagnosed with acute low back pain. She will be discharged home 

with follow up with her PCP in 2-3 days. She agrees with this plan and 

understands the need for return to the ED for any new or worsening symptoms.





- Diagnoses


Provider Diagnoses: 


 Acute low back pain








Discharge





- Sign-Out/Discharge


Documenting (check all that apply): Patient Departure - Patient will be 

discharged home.


Patient Received Moderate/Deep Sedation with Procedure: No





- Discharge Plan


Condition: Good


Disposition: HOME


Patient Education Materials:  Acute Low Back Pain (ED)


Referrals: 


Reji Leung [Primary Care Provider] - 3 Days (if not improving)


Additional Instructions: 


Follow up with your primary care provider in 2-3 days.





RETURN TO THE EMERGENCY DEPARTMENT FOR ANY NEW OR WORSENING SYMPTOMS.





- Billing Disposition and Condition


Condition: GOOD


Disposition: Home





- Attestation Statements


Document Initiated by Arvind: Yes


Documenting Scribe: Shivani Ram


Provider For Whom Arvind is Documenting (Include Credential): Dr. Jung Garcia MD


Scribe Attestation: 


Shivani MILLAN scribed for Dr. Jung Garcia MD on 04/20/19 at 0657. 


Scribe Documentation Reviewed: Yes


Provider Attestation: 


The documentation as recorded by the Shivani ceja accurately reflects 

the service I personally performed and the decisions made by me, Dr. Jung Garcia MD


Status of Scribe Document: Viewed

## 2019-04-20 VITALS — SYSTOLIC BLOOD PRESSURE: 133 MMHG | DIASTOLIC BLOOD PRESSURE: 80 MMHG
